# Patient Record
Sex: FEMALE | Race: BLACK OR AFRICAN AMERICAN | NOT HISPANIC OR LATINO
[De-identification: names, ages, dates, MRNs, and addresses within clinical notes are randomized per-mention and may not be internally consistent; named-entity substitution may affect disease eponyms.]

---

## 2020-01-23 ENCOUNTER — TRANSCRIPTION ENCOUNTER (OUTPATIENT)
Age: 55
End: 2020-01-23

## 2020-11-15 ENCOUNTER — INPATIENT (INPATIENT)
Facility: HOSPITAL | Age: 55
LOS: 0 days | Discharge: HOME | DRG: 494 | End: 2020-11-15
Attending: ORTHOPAEDIC SURGERY | Admitting: ORTHOPAEDIC SURGERY
Payer: COMMERCIAL

## 2020-11-15 ENCOUNTER — TRANSCRIPTION ENCOUNTER (OUTPATIENT)
Age: 55
End: 2020-11-15

## 2020-11-15 VITALS
TEMPERATURE: 99 F | HEART RATE: 95 BPM | OXYGEN SATURATION: 97 % | DIASTOLIC BLOOD PRESSURE: 82 MMHG | SYSTOLIC BLOOD PRESSURE: 137 MMHG | RESPIRATION RATE: 22 BRPM | WEIGHT: 220.02 LBS

## 2020-11-15 VITALS
TEMPERATURE: 98 F | DIASTOLIC BLOOD PRESSURE: 74 MMHG | HEART RATE: 87 BPM | OXYGEN SATURATION: 96 % | RESPIRATION RATE: 18 BRPM | SYSTOLIC BLOOD PRESSURE: 123 MMHG

## 2020-11-15 DIAGNOSIS — S82.891A OTHER FRACTURE OF RIGHT LOWER LEG, INITIAL ENCOUNTER FOR CLOSED FRACTURE: ICD-10-CM

## 2020-11-15 LAB
ALBUMIN SERPL ELPH-MCNC: 4.1 G/DL — SIGNIFICANT CHANGE UP (ref 3.3–5)
ALP SERPL-CCNC: 51 U/L — SIGNIFICANT CHANGE UP (ref 40–120)
ALT FLD-CCNC: 32 U/L — SIGNIFICANT CHANGE UP (ref 12–78)
ANION GAP SERPL CALC-SCNC: 5 MMOL/L — SIGNIFICANT CHANGE UP (ref 5–17)
APTT BLD: 28 SEC — SIGNIFICANT CHANGE UP (ref 27.5–35.5)
AST SERPL-CCNC: 21 U/L — SIGNIFICANT CHANGE UP (ref 15–37)
BASOPHILS # BLD AUTO: 0.04 K/UL — SIGNIFICANT CHANGE UP (ref 0–0.2)
BASOPHILS NFR BLD AUTO: 0.4 % — SIGNIFICANT CHANGE UP (ref 0–2)
BILIRUB SERPL-MCNC: 0.4 MG/DL — SIGNIFICANT CHANGE UP (ref 0.2–1.2)
BUN SERPL-MCNC: 15 MG/DL — SIGNIFICANT CHANGE UP (ref 7–23)
CALCIUM SERPL-MCNC: 9.3 MG/DL — SIGNIFICANT CHANGE UP (ref 8.5–10.1)
CHLORIDE SERPL-SCNC: 110 MMOL/L — HIGH (ref 96–108)
CO2 SERPL-SCNC: 25 MMOL/L — SIGNIFICANT CHANGE UP (ref 22–31)
CREAT SERPL-MCNC: 0.88 MG/DL — SIGNIFICANT CHANGE UP (ref 0.5–1.3)
EOSINOPHIL # BLD AUTO: 0.05 K/UL — SIGNIFICANT CHANGE UP (ref 0–0.5)
EOSINOPHIL NFR BLD AUTO: 0.5 % — SIGNIFICANT CHANGE UP (ref 0–6)
GLUCOSE SERPL-MCNC: 127 MG/DL — HIGH (ref 70–99)
HCG SERPL-ACNC: 1 MIU/ML — SIGNIFICANT CHANGE UP
HCT VFR BLD CALC: 39.4 % — SIGNIFICANT CHANGE UP (ref 34.5–45)
HGB BLD-MCNC: 13.9 G/DL — SIGNIFICANT CHANGE UP (ref 11.5–15.5)
IMM GRANULOCYTES NFR BLD AUTO: 0.4 % — SIGNIFICANT CHANGE UP (ref 0–1.5)
INR BLD: 0.93 RATIO — SIGNIFICANT CHANGE UP (ref 0.88–1.16)
LYMPHOCYTES # BLD AUTO: 1.26 K/UL — SIGNIFICANT CHANGE UP (ref 1–3.3)
LYMPHOCYTES # BLD AUTO: 12.5 % — LOW (ref 13–44)
MCHC RBC-ENTMCNC: 28.5 PG — SIGNIFICANT CHANGE UP (ref 27–34)
MCHC RBC-ENTMCNC: 35.3 GM/DL — SIGNIFICANT CHANGE UP (ref 32–36)
MCV RBC AUTO: 80.7 FL — SIGNIFICANT CHANGE UP (ref 80–100)
MONOCYTES # BLD AUTO: 0.53 K/UL — SIGNIFICANT CHANGE UP (ref 0–0.9)
MONOCYTES NFR BLD AUTO: 5.2 % — SIGNIFICANT CHANGE UP (ref 2–14)
NEUTROPHILS # BLD AUTO: 8.19 K/UL — HIGH (ref 1.8–7.4)
NEUTROPHILS NFR BLD AUTO: 81 % — HIGH (ref 43–77)
PLATELET # BLD AUTO: 301 K/UL — SIGNIFICANT CHANGE UP (ref 150–400)
POTASSIUM SERPL-MCNC: 4.4 MMOL/L — SIGNIFICANT CHANGE UP (ref 3.5–5.3)
POTASSIUM SERPL-SCNC: 4.4 MMOL/L — SIGNIFICANT CHANGE UP (ref 3.5–5.3)
PROT SERPL-MCNC: 7.9 GM/DL — SIGNIFICANT CHANGE UP (ref 6–8.3)
PROTHROM AB SERPL-ACNC: 10.9 SEC — SIGNIFICANT CHANGE UP (ref 10.6–13.6)
RBC # BLD: 4.88 M/UL — SIGNIFICANT CHANGE UP (ref 3.8–5.2)
RBC # FLD: 12.5 % — SIGNIFICANT CHANGE UP (ref 10.3–14.5)
SARS-COV-2 IGG SERPL QL IA: NEGATIVE — SIGNIFICANT CHANGE UP
SARS-COV-2 IGM SERPL IA-ACNC: 0.08 INDEX — SIGNIFICANT CHANGE UP
SARS-COV-2 RNA SPEC QL NAA+PROBE: SIGNIFICANT CHANGE UP
SODIUM SERPL-SCNC: 140 MMOL/L — SIGNIFICANT CHANGE UP (ref 135–145)
WBC # BLD: 10.11 K/UL — SIGNIFICANT CHANGE UP (ref 3.8–10.5)
WBC # FLD AUTO: 10.11 K/UL — SIGNIFICANT CHANGE UP (ref 3.8–10.5)

## 2020-11-15 PROCEDURE — C1889: CPT

## 2020-11-15 PROCEDURE — 71045 X-RAY EXAM CHEST 1 VIEW: CPT | Mod: 26

## 2020-11-15 PROCEDURE — C1713: CPT

## 2020-11-15 PROCEDURE — 93010 ELECTROCARDIOGRAM REPORT: CPT

## 2020-11-15 PROCEDURE — 73130 X-RAY EXAM OF HAND: CPT | Mod: 26,RT

## 2020-11-15 PROCEDURE — 73590 X-RAY EXAM OF LOWER LEG: CPT | Mod: 26,RT

## 2020-11-15 PROCEDURE — 76000 FLUOROSCOPY <1 HR PHYS/QHP: CPT

## 2020-11-15 PROCEDURE — 27792 TREATMENT OF ANKLE FRACTURE: CPT | Mod: RT

## 2020-11-15 PROCEDURE — 73610 X-RAY EXAM OF ANKLE: CPT | Mod: 26,RT

## 2020-11-15 PROCEDURE — 73610 X-RAY EXAM OF ANKLE: CPT | Mod: 26,RT,77

## 2020-11-15 PROCEDURE — 27829 TREAT LOWER LEG JOINT: CPT | Mod: RT

## 2020-11-15 RX ORDER — OXYCODONE HYDROCHLORIDE 5 MG/1
1 TABLET ORAL
Qty: 28 | Refills: 0
Start: 2020-11-15 | End: 2020-11-21

## 2020-11-15 RX ORDER — SODIUM CHLORIDE 9 MG/ML
1000 INJECTION INTRAMUSCULAR; INTRAVENOUS; SUBCUTANEOUS
Refills: 0 | Status: DISCONTINUED | OUTPATIENT
Start: 2020-11-15 | End: 2020-11-15

## 2020-11-15 RX ORDER — ASPIRIN/CALCIUM CARB/MAGNESIUM 324 MG
1 TABLET ORAL
Qty: 60 | Refills: 0
Start: 2020-11-15 | End: 2020-12-14

## 2020-11-15 RX ORDER — OXYCODONE HYDROCHLORIDE 5 MG/1
5 TABLET ORAL EVERY 4 HOURS
Refills: 0 | Status: DISCONTINUED | OUTPATIENT
Start: 2020-11-15 | End: 2020-11-15

## 2020-11-15 RX ORDER — OXYCODONE HYDROCHLORIDE 5 MG/1
10 TABLET ORAL ONCE
Refills: 0 | Status: DISCONTINUED | OUTPATIENT
Start: 2020-11-15 | End: 2020-11-15

## 2020-11-15 RX ORDER — FENTANYL CITRATE 50 UG/ML
50 INJECTION INTRAVENOUS
Refills: 0 | Status: DISCONTINUED | OUTPATIENT
Start: 2020-11-15 | End: 2020-11-15

## 2020-11-15 RX ORDER — ACETAMINOPHEN 500 MG
975 TABLET ORAL EVERY 8 HOURS
Refills: 0 | Status: DISCONTINUED | OUTPATIENT
Start: 2020-11-15 | End: 2020-11-15

## 2020-11-15 RX ORDER — IBUPROFEN 200 MG
600 TABLET ORAL ONCE
Refills: 0 | Status: COMPLETED | OUTPATIENT
Start: 2020-11-15 | End: 2020-11-15

## 2020-11-15 RX ORDER — ONDANSETRON 8 MG/1
4 TABLET, FILM COATED ORAL EVERY 6 HOURS
Refills: 0 | Status: DISCONTINUED | OUTPATIENT
Start: 2020-11-15 | End: 2020-11-15

## 2020-11-15 RX ORDER — OXYCODONE HYDROCHLORIDE 5 MG/1
5 TABLET ORAL ONCE
Refills: 0 | Status: DISCONTINUED | OUTPATIENT
Start: 2020-11-15 | End: 2020-11-15

## 2020-11-15 RX ORDER — LANOLIN ALCOHOL/MO/W.PET/CERES
3 CREAM (GRAM) TOPICAL AT BEDTIME
Refills: 0 | Status: DISCONTINUED | OUTPATIENT
Start: 2020-11-15 | End: 2020-11-15

## 2020-11-15 RX ORDER — ACETAMINOPHEN 500 MG
650 TABLET ORAL EVERY 6 HOURS
Refills: 0 | Status: DISCONTINUED | OUTPATIENT
Start: 2020-11-15 | End: 2020-11-15

## 2020-11-15 RX ORDER — ONDANSETRON 8 MG/1
4 TABLET, FILM COATED ORAL ONCE
Refills: 0 | Status: DISCONTINUED | OUTPATIENT
Start: 2020-11-15 | End: 2020-11-15

## 2020-11-15 RX ORDER — HYDROMORPHONE HYDROCHLORIDE 2 MG/ML
0.5 INJECTION INTRAMUSCULAR; INTRAVENOUS; SUBCUTANEOUS
Refills: 0 | Status: DISCONTINUED | OUTPATIENT
Start: 2020-11-15 | End: 2020-11-15

## 2020-11-15 RX ORDER — OXYCODONE HYDROCHLORIDE 5 MG/1
10 TABLET ORAL EVERY 4 HOURS
Refills: 0 | Status: DISCONTINUED | OUTPATIENT
Start: 2020-11-15 | End: 2020-11-15

## 2020-11-15 RX ORDER — SODIUM CHLORIDE 9 MG/ML
1000 INJECTION, SOLUTION INTRAVENOUS
Refills: 0 | Status: DISCONTINUED | OUTPATIENT
Start: 2020-11-15 | End: 2020-11-15

## 2020-11-15 RX ORDER — MEPERIDINE HYDROCHLORIDE 50 MG/ML
12.5 INJECTION INTRAMUSCULAR; INTRAVENOUS; SUBCUTANEOUS
Refills: 0 | Status: DISCONTINUED | OUTPATIENT
Start: 2020-11-15 | End: 2020-11-15

## 2020-11-15 RX ADMIN — Medication 600 MILLIGRAM(S): at 09:12

## 2020-11-15 NOTE — ED ADULT NURSE NOTE - TEMPLATE LIST FOR HEAD TO TOE ASSESSMENT
Instructions: This plan will send the code FBSE to the PM system.  DO NOT or CHANGE the price. Price (Do Not Change): 0.00 Detail Level: Simple General

## 2020-11-15 NOTE — ED ADULT TRIAGE NOTE - STATUS:
Airway  Urgency: elective    Date/Time: 2/25/2020 12:07 PM    General Information and Staff    Patient location during procedure: OR  Anesthesiologist: Placido Schrader MD    Indications and Patient Condition    Preoxygenated: yes      Final Airway Details  Final airway type: supraglottic airway      Successful airway: classic  Size 4    Number of attempts at approach: 1               Applied

## 2020-11-15 NOTE — ED PROVIDER NOTE - PROGRESS NOTE DETAILS
Distal fib fx, medial widening c/w bimall -- ortho consulted. Pre-op labs, ECG ordered. Patient updated.

## 2020-11-15 NOTE — ED ADULT NURSE NOTE - NSIMPLEMENTINTERV_GEN_ALL_ED
Implemented All Fall Risk Interventions:  [place] to call system. Call bell, personal items and telephone within reach. Instruct patient to call for assistance. Room bathroom lighting operational. Non-slip footwear when patient is off stretcher. Physically safe environment: no spills, clutter or unnecessary equipment. Stretcher in lowest position, wheels locked, appropriate side rails in place. Provide visual cue, wrist band, yellow gown, etc. Monitor gait and stability. Monitor for mental status changes and reorient to person, place, and time. Review medications for side effects contributing to fall risk. Reinforce activity limits and safety measures with patient and family.

## 2020-11-15 NOTE — H&P ADULT - NSHPPHYSICALEXAM_GEN_ALL_CORE
Vitals:  T(C): 36.8 (11-15-20 @ 08:46), Max: 37.1 (11-15-20 @ 07:43)  HR: 87 (11-15-20 @ 08:46) (87 - 95)  BP: 136/95 (11-15-20 @ 08:46) (136/95 - 137/82)  RR: 17 (11-15-20 @ 08:46) (17 - 22)  SpO2: 98% (11-15-20 @ 08:46) (97% - 98%)    Physical Exam:  Gen: NAD, AAOx3    RLE:   Skin intact  +edema, erythema, ecchymosis at ankle  +TTP over medial and lateral malleoli   Gross Bony Deformity of Ankle  No bony TTP of Hip/Knee/Foot/Toes  NT with AROM/PROM of Hip/Knee/Toes  Able to SLR  Negative logroll  +EHL/FHL/TA/GS  SILT L2-S1  +DP/PT Pulses  Compartments soft and compressible  No calf TTP B/L    Secondary Assessment:  NC/AT, NTTP of clavicles, NTTP of C-,T-,L-Spine, NTTP of Pelvis  UEs: NTTP of Shoulders, Elbows, Wrists, Hands; NT with AROM/PROM of Shoulders, Elbows, Wrists, Hands; AIN/PIN/Med/Uln/Msc/Rad/Ax intact  LLE: Able to SLR, NT with Log Roll, NT with Heel Strike, NTTP of Hip, Knee, Ankle, Foot; NT with AROM/PROM of Hip, Knee, Ankle, Foot; Q/H/Gsc/TA/EHL/FHL intact    Procedure: Procedure explained in detail to patient at bedside. Pt expressed understanding and all questions were answered. Consent for procedure was verbally obtained. Under sterile technique, 10cc 1% lidocaine were injected into the fracture site as a hematoma block. Adequate anesthesia obtained with hematoma block. Closed reduction of the fracture was performed and patient was placed into a well-padded tri-mejias splint. Pt NV intact following splint placement and post reduction XRs demonstrated adequate reduction.

## 2020-11-15 NOTE — H&P ADULT - ASSESSMENT
Pt is a 54y Female with a Right Zaire Equivalent Ankle Fracture-Dislocation.  -Closed Fracture, NV Intact  -Pain control  -XR R Ankle/Tib Fib: comminuted fracture of distal fibula at the level of the plafond, diastasis with tibiotalar dislocation indicative of full-thickness deltoid rupture  -Pt placed in well-padded tri-mejias splint. NV Intact following splint placement. Discussed proper splint maintenance with pt.  -Post-reduction XR demonstrated adequate reduction.  -Rest, ice, elevate affected ankle  -NWB on affected ankle  -Admit to Ortho  -NPO except meds  -IVF while NPO  -FU Covid  -FU Preop labs  -Plan for OR today for ORIF R Ankle with Dr. Hsu.  -Discussed with Dr. Hsu.    Lien Dupree M.D.  PGY-3 Orthopaedic Surgery

## 2020-11-15 NOTE — DISCHARGE NOTE NURSING/CASE MANAGEMENT/SOCIAL WORK - PATIENT PORTAL LINK FT
You can access the FollowMyHealth Patient Portal offered by [facility] by registering at the following website: [email or web address]. By joining [employer]’s FollowMyHealth portal, you will also be able to view your health information using other applications (apps) compatible with our system.

## 2020-11-15 NOTE — DISCHARGE NOTE PROVIDER - NSDCCPCAREPLAN_GEN_ALL_CORE_FT
PRINCIPAL DISCHARGE DIAGNOSIS  Diagnosis: Ankle fracture, right  Assessment and Plan of Treatment: 1.	Pain Control  2.	Non-weight bearing  right lower extremity in trilam splint, with assistive devices as needed.  3.	DVT Prophylaxis--Aspirin 325 twice a day for 30 days.  4.	PT as needed  5.	Follow up with Dr. George as outpatient in 10-14 days after discharge from the hospital or rehab. Call office for appointment.   6.	Staple/Suture removal and repeat x-rays on Post-Op Day 14.  7.	Ice/Elevate affected area as needed  8.	Keep Dressing/Splint Clean and dry.

## 2020-11-15 NOTE — ED ADULT TRIAGE NOTE - CHIEF COMPLAINT QUOTE
pt twisted right ankle while walking her dog. did not fall. painful weight bearing. right ankle swollen.

## 2020-11-15 NOTE — ED PROVIDER NOTE - OBJECTIVE STATEMENT
54F no PMH here c/o right ankle pain s/p slip and fall. No head/neck/back pain. Right ring finger pain.  Unable to bear weight. No other complaints

## 2020-11-15 NOTE — DISCHARGE NOTE PROVIDER - HOSPITAL COURSE
The patient is a 54 year old female status post Open Reduction Surgical Fixation of a Right Bimalleolar Equivalent Fracture after being admitted through [facility] Emergency Room. The Patient was medically optimized for the previously mentioned surgical procedure. The patient was taken to the operating room on 11/15/20. Prophylactic antibiotics were started before the procedure. There were no complications during the procedure and patient tolerated the procedure well. The patient was transferred to recovery room in stable condition and subsequently to surgical floor.  Patient was placed on ASA for anticoagulation.  All home medications were continued. The patient was discharged in stable condition to follow up as outpatient.

## 2020-11-15 NOTE — CONSULT NOTE ADULT - ASSESSMENT
Pt is a 54y Female with a Zaire Equivalent Ankle Fracture-Dislocation.  -Closed Fracture, NV Intact  -Pain control  -XR R Ankle/Tib Fib: comminuted fracture of distal fibula at the level of the plafond, diastasis with tibiotalar dislocation indicative of full-thickness deltoid rupture  -Pt placed in well-padded tri-mejias splint. NV Intact following splint placement. Discussed proper splint maintenance with pt.  -Post-reduction XR demonstrated adequate reduction.  -Rest, ice, elevate affected ankle  -NWB on affected ankle  -Discussed signs and symptoms of compartment syndrome with patient. Pt informed to follow up immediately should these signs or symptoms develop. Pt expressed understanding and all questions were answered.  -Discussed possible need for surgical fixation.  -Please follow up in office within 3-5 days of discharge from ED with Dr. Hsu. Call office for appointment.  -Ortho stable for discharge.    Lien Dupree M.D.  PGY-3 Orthopaedic Surgery ****NOTE INCOMPLETE--IN PROGRESS--WILL UPDATE WITH COMPLETE PLAN****

## 2020-11-15 NOTE — H&P ADULT - NSHPLABSRESULTS_GEN_ALL_CORE
Imaging:  XR R Ankle/Tib Fib: comminuted fracture of distal fibula at the level of the plafond, diastasis with tibiotalar dislocation indicative of full-thickness deltoid rupture    Labs:                        13.9   10.11 )-----------( 301      ( 15 Nov 2020 08:53 )             39.4

## 2020-11-15 NOTE — ED ADULT NURSE NOTE - OBJECTIVE STATEMENT
c/o slip and fall on grass while walking dog this morning, denies hitting head, denies LOC, does not take daily anticoagulants, c/o right ankle injury and pain, right ankle medial swelling noted Hx; denies

## 2020-11-15 NOTE — DISCHARGE NOTE PROVIDER - NSDCFUADDINST_GEN_ALL_CORE_FT
1.	Pain Control 2.	Non-weight bearing  right lower extremity in trilam splint, with assistive devices as needed. 3.	DVT Prophylaxis--Aspirin 325 twice a day for 30 days. 4.	PT as needed 5.	Follow up with Dr. George as outpatient in 10-14 days after discharge from the hospital or rehab. Call office for appointment.  6.	Staple/Suture removal and repeat x-rays on Post-Op Day 14. 7.	Ice/Elevate affected area as needed 8.	Keep Dressing/Splint Clean and dry.

## 2020-11-15 NOTE — DISCHARGE NOTE PROVIDER - NSDCMRMEDTOKEN_GEN_ALL_CORE_FT
aspirin 325 mg oral tablet: 1 tab(s) orally 2 times a day   oxyCODONE 5 mg oral tablet: 1 tab(s) orally every 6 hours, As Needed for moderate-severe pain MDD:4

## 2020-11-15 NOTE — H&P ADULT - ATTENDING COMMENTS
Orthopedic Sports Attending:    Agree with above resident/PA note.  Note edited where necessary.      Patient seen and examined at bedside. Discussed the risks, complications, benefits and alternatives of care. Confirmed procedure and site. Patient fully understands and would like to proceed with surgery.    Santiago Hsu, DO  Orthopaedic Surgery

## 2020-11-15 NOTE — DISCHARGE NOTE PROVIDER - CARE PROVIDER_API CALL
Santiago Hsu  [facility][note 3]  [address]  [place] [address]  Phone: [phone]  Fax: [phone]  Follow Up Time:

## 2020-11-15 NOTE — ED ADULT NURSE NOTE - CAS EDN DISCHARGE ASSESSMENT
Alert and oriented to person, place and time no acute respiratory distress, OOB to bathroom, voiding without difficulty, no c/o pain currently., npo status maintained./Alert and oriented to person, place and time

## 2020-11-15 NOTE — H&P ADULT - HISTORY OF PRESENT ILLNESS
Pt is a 54y Female presenting with R ankle pain s/p mechanical fall this morning while walking her dog. Pt is a community ambulatory at baseline. Pt has been unable to bear weight on affected extremity since time of injury. Pt denies numbness, tingling, or paresthesias in affected limb. Pt denies headstrike or LOC and denies any other orthopaedic injuries at this time. Pt denies taking blood thinners. Pt last ate at yesterday. Denies fevers, dizziness, CP, SOB, N/V, calf pain. Pt works as an  in [place]. Patient has never seen and orthopaedist before.

## 2020-11-17 PROBLEM — Z78.9 OTHER SPECIFIED HEALTH STATUS: Chronic | Status: ACTIVE | Noted: 2020-11-15

## 2020-11-19 DIAGNOSIS — S93.04XA DISLOCATION OF RIGHT ANKLE JOINT, INITIAL ENCOUNTER: ICD-10-CM

## 2020-11-19 DIAGNOSIS — S82.61XA DISPLACED FRACTURE OF LATERAL MALLEOLUS OF RIGHT FIBULA, INITIAL ENCOUNTER FOR CLOSED FRACTURE: ICD-10-CM
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
## 2020-11-19 DIAGNOSIS — S82.891A OTHER FRACTURE OF RIGHT LOWER LEG, INITIAL ENCOUNTER FOR CLOSED FRACTURE: ICD-10-CM
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
## 2020-11-30 PROBLEM — Z00.00 ENCOUNTER FOR PREVENTIVE HEALTH EXAMINATION: Status: ACTIVE | Noted: 2020-11-30

## 2020-12-01 ENCOUNTER — APPOINTMENT (OUTPATIENT)
Dept: ORTHOPEDIC SURGERY | Facility: CLINIC | Age: 55
End: 2020-12-01
Payer: COMMERCIAL

## 2020-12-01 VITALS
WEIGHT: 220 LBS | HEART RATE: 99 BPM | BODY MASS INDEX: 34.53 KG/M2 | HEIGHT: 67 IN | DIASTOLIC BLOOD PRESSURE: 80 MMHG | SYSTOLIC BLOOD PRESSURE: 120 MMHG

## 2020-12-01 PROCEDURE — 73610 X-RAY EXAM OF ANKLE: CPT | Mod: 26,RT

## 2020-12-01 PROCEDURE — 29405 APPL SHORT LEG CAST: CPT | Mod: 58,RT

## 2020-12-01 PROCEDURE — 99024 POSTOP FOLLOW-UP VISIT: CPT

## 2020-12-01 NOTE — HISTORY OF PRESENT ILLNESS
[___ Days Post Op] : post op day #[unfilled] [Clean/Dry/Intact] : clean, dry and intact [Swelling] : swollen [Neuro Intact] : an unremarkable neurological exam [Vascular Intact] : ~T peripheral vascular exam normal [Negative Ran's] : maneuvers demonstrated a negative Ran's sign [Xray (Date:___)] : [unfilled] Xray -  [Doing Well] : is doing well [Excellent Pain Control] : has excellent pain control [No Sign of Infection] : is showing no signs of infection [Chills] : no chills [Fever] : no fever [Nausea] : no nausea [Vomiting] : no vomiting [Healed] : not healed [Erythema] : not erythematous [Discharge] : absent of discharge [Dehiscence] : not dehisced [de-identified] : S/P ORIF right distal fibula with syndesmotic screw.  DOS: 11/15/2020. [de-identified] : GURPREET is a 54 year old female who presents today 16 days S/P right distal fibula ORIF.  She admits to injuring her ankle on 11/15/2020 and being brought to  ED by ambulance.  Patient underwent surgery the same day.  She presents with her splint in tact and NWB on crutches.  Patient's skin is clean, dry and intact without evidence of irritation.  She is able to move her toes.  She denies calf warmth, pain, or swelling.  No fevers, chills, nausea or vomiting.  She denies taking pain medications at this time.  She has kept the splint dry.  She admits to occasional twinges of discomfort. She admits to sitting at a desk working from home with her foot dependant or out in front of her on a chair without regularly elevating above heart level.  [de-identified] : Right Ankle\par \par Incisions are clean, dry and intact. No surrounding erythema. No drainage. Wound appears to be healing well. ROM not testing due to recent procedure.  Motor and sensation are intact distally. She has full range of motion of all toes. She has good nerve function. She has no sensory deficits over the saphenous nerve. Dorsalis pedis pulses 2+. Cap refill 2+ to all toes.\par \par Neg homans, calf soft compressible [de-identified] : 3 views of the right ankle were performed today and are available for me to review.  They were discussed with the patient and demonstrate adequate positioning of the hardware.  Mild callus formation noted. No new fracture, no dislocation, no deformities.\par \par \par These radiographs were reviewed and confirmed by Dr Hsu. [de-identified] : GURPREET is a 54 year old female who presents today 16 days S/P right distal fibula ORIF.  She admits to injuring her ankle on 11/15/2020 and being brought to  ED by ambulance.  Patient underwent surgery the same day.  She presents with her splint in tact and NWB on crutches.  Patient's skin is clean, dry and intact without evidence of irritation.  She is able to move her toes.  She denies calf warmth, pain, or swelling.  No fevers, chills, nausea or vomiting.  She denies taking pain medications at this time.  She has kept the splint dry.  She admits to occasional twinges of discomfort. She admits to sitting at a desk working from home with her foot dependant or out in front of her on a chair without regularly elevating above heart level. At this time i educated her on proper elevation above heart level. Her stitches as well as her splint were removed today and a short leg cast was placed. Patient tolerated this well. Able to move all toes with cap refill < 2 seconds throught after placement without pain. Educated patient on signs and symptoms of compartment syndrome and she understands the importance of elevation to prevent this. If symptoms occur after hours she will go to nearest ED for cast removal. If during office hours she will see us right away. She will remain fully NWB with use of her crutches and see us in 4 weeks for new xrays, cast removal and wound check. She agrees with the above plan and all questions were answered.\par

## 2021-01-05 ENCOUNTER — APPOINTMENT (OUTPATIENT)
Dept: ORTHOPEDIC SURGERY | Facility: CLINIC | Age: 56
End: 2021-01-05
Payer: COMMERCIAL

## 2021-01-05 PROCEDURE — 99024 POSTOP FOLLOW-UP VISIT: CPT

## 2021-01-05 PROCEDURE — 73600 X-RAY EXAM OF ANKLE: CPT | Mod: RT

## 2021-01-06 NOTE — HISTORY OF PRESENT ILLNESS
[___ Weeks Post Op] : [unfilled] weeks post op [Clean/Dry/Intact] : clean, dry and intact [Swelling] : swollen [Neuro Intact] : an unremarkable neurological exam [Vascular Intact] : ~T peripheral vascular exam normal [Negative Ran's] : maneuvers demonstrated a negative Ran's sign [Xray (Date:___)] : [unfilled] Xray -  [Doing Well] : is doing well [Excellent Pain Control] : has excellent pain control [No Sign of Infection] : is showing no signs of infection [Chills] : no chills [Fever] : no fever [Nausea] : no nausea [Vomiting] : no vomiting [Healed] : not healed [Erythema] : not erythematous [Discharge] : absent of discharge [Dehiscence] : not dehisced [de-identified] : S/P ORIF right distal fibula with syndesmotic screw.  DOS: 11/15/2020. [de-identified] : GURPREET is a 55 year old female who presents today 7 weeks S/P right distal fibula ORIF.  She admits to injuring her ankle on 11/15/2020 and being brought to  ED by ambulance.  Patient underwent surgery the same day.  She presents with her cast in tact and NWB on crutches.  Patient's skin is clean, dry and intact without evidence of irritation.  She is able to move her toes.  She denies calf warmth, pain, or swelling.  No fevers, chills, nausea or vomiting.  She denies taking pain medications at this time.  She has kept the cast dry.  She admits to occasional twinges of discomfort. She admits to sitting at a desk working from home with her foot dependant or out in front of her on a chair without regularly elevating above heart level.  [de-identified] : Right Ankle\par \par Incisions are clean, dry and intact. No surrounding erythema. No drainage. Wound appears to be healing well. ROM not testing due to recent procedure.  Motor and sensation are intact distally. She has full range of motion of all toes. She has good nerve function. She has no sensory deficits over the saphenous nerve. Dorsalis pedis pulses 2+. Cap refill 2+ to all toes.\par \par Neg homans, calf soft compressible [de-identified] : 3 views of the right ankle were performed today and are available for me to review.  They were discussed with the patient and demonstrate adequate positioning of the hardware.  Mild callus formation noted. No new fracture, no dislocation, no deformities.\par  [de-identified] : GURPREET is a 55 year old female who presents today 7 weeks S/P right distal fibula ORIF.  She admits to injuring her ankle on 11/15/2020 and being brought to  ED by ambulance.  Patient underwent surgery the same day.  She presents with her cast in tact and NWB on crutches.  Patient's skin is clean, dry and intact without evidence of irritation.  She is able to move her toes.  She denies calf warmth, pain, or swelling.  No fevers, chills, nausea or vomiting.  She denies taking pain medications at this time.  She has kept the cast dry.  She admits to occasional twinges of discomfort. She admits to sitting at a desk working from home with her foot dependant or out in front of her on a chair without regularly elevating above heart level. \par \par At this time i educated her on proper elevation above heart level. Her short leg cast was removed today and the patient was placed into a camboot today. Patient tolerated this well.  She will remain fully NWB with use of her crutches and see us in 4 weeks for new xrays, and wound check. She agrees with the above plan and all questions were answered.\par

## 2021-01-12 ENCOUNTER — NON-APPOINTMENT (OUTPATIENT)
Age: 56
End: 2021-01-12

## 2021-02-04 ENCOUNTER — APPOINTMENT (OUTPATIENT)
Dept: ORTHOPEDIC SURGERY | Facility: CLINIC | Age: 56
End: 2021-02-04
Payer: COMMERCIAL

## 2021-02-04 PROCEDURE — 73610 X-RAY EXAM OF ANKLE: CPT | Mod: RT

## 2021-02-04 PROCEDURE — 99024 POSTOP FOLLOW-UP VISIT: CPT

## 2021-02-08 NOTE — HISTORY OF PRESENT ILLNESS
[___ Weeks Post Op] : [unfilled] weeks post op [Clean/Dry/Intact] : clean, dry and intact [Swelling] : swollen [Neuro Intact] : an unremarkable neurological exam [Vascular Intact] : ~T peripheral vascular exam normal [Negative Ran's] : maneuvers demonstrated a negative Ran's sign [Xray (Date:___)] : [unfilled] Xray -  [Doing Well] : is doing well [Excellent Pain Control] : has excellent pain control [No Sign of Infection] : is showing no signs of infection [Chills] : no chills [Fever] : no fever [Nausea] : no nausea [Vomiting] : no vomiting [Healed] : not healed [Erythema] : not erythematous [Discharge] : absent of discharge [Dehiscence] : not dehisced [de-identified] : S/P ORIF right distal fibula with syndesmotic screw.  DOS: 11/15/2020. [de-identified] : GURPREET is a 55 year old female who presents today 3 months S/P right distal fibula ORIF.  She admits to injuring her ankle on 11/15/2020 and being brought to  ED by ambulance.  Patient underwent surgery the same day.  She presents with her camboot on and NWB on crutches.  Patient's skin is clean, dry and intact without evidence of irritation.  She is able to move her toes.  She denies calf warmth, pain, or swelling.  No fevers, chills, nausea or vomiting.  She denies taking pain medications at this time.  She admits to occasional twinges of discomfort. She admits to sitting at a desk working from home with her foot dependant or out in front of her on a chair. [de-identified] : Right Ankle\par \par Incisions are clean, dry and intact. No surrounding erythema. No drainage. Wound appears to be healing well. ROM not testing due to recent procedure.  Motor and sensation are intact distally. She has full range of motion of all toes. She has good nerve function. She has no sensory deficits over the saphenous nerve. Dorsalis pedis pulses 2+. Cap refill 2+ to all toes.\par \par Neg homans, calf soft compressible [de-identified] : 3 views of the right ankle were performed today and are available for me to review.  They were discussed with the patient and demonstrate adequate positioning of the hardware.  Mild callus formation noted. No new fracture, no dislocation, no deformities. [de-identified] : GURPREET is a 55 year old female who presents today 3 months S/P right distal fibula ORIF.  She admits to injuring her ankle on 11/15/2020 and being brought to  ED by ambulance.  Patient underwent surgery the same day.  She presents with her camboot on and NWB on crutches.  Patient's skin is clean, dry and intact without evidence of irritation.  She is able to move her toes.  She denies calf warmth, pain, or swelling.  No fevers, chills, nausea or vomiting.  She denies taking pain medications at this time.  She admits to occasional twinges of discomfort. She admits to sitting at a desk working from home with her foot dependant or out in front of her on a chair.\par \par We reviewed her radiographs today that show mild callus formation and proper alignment of hardware. We discussed the possible need for removal of syndesmotic screw versus allowing it to heal this way on its own. We discussed the risks versus benefits of both. At this time the patient elects to proceed operative for screw removal. I had my surgical coordinator meet with the patient to go over surgical dates. She will received pre-op clearance by her PCP prior to this procedure. She understands the risks vs benefits of this.\par \par She will remain fully NWB with use of her crutches and camboot until this procedure. She agrees with the above plan and all questions were answered.

## 2021-02-12 ENCOUNTER — APPOINTMENT (OUTPATIENT)
Dept: ORTHOPEDIC SURGERY | Facility: AMBULATORY SURGERY CENTER | Age: 56
End: 2021-02-12
Payer: COMMERCIAL

## 2021-02-12 ENCOUNTER — NON-APPOINTMENT (OUTPATIENT)
Age: 56
End: 2021-02-12

## 2021-02-12 PROCEDURE — 20680 REMOVAL OF IMPLANT DEEP: CPT | Mod: 78,RT

## 2021-02-12 RX ORDER — ASPIRIN 325 MG/1
325 TABLET ORAL
Qty: 28 | Refills: 0 | Status: COMPLETED | COMMUNITY
Start: 2021-02-12 | End: 2021-03-12

## 2021-02-12 RX ORDER — ONDANSETRON 4 MG/1
4 TABLET ORAL
Qty: 30 | Refills: 0 | Status: COMPLETED | COMMUNITY
Start: 2021-02-12 | End: 2021-02-17

## 2021-02-12 RX ORDER — ACETAMINOPHEN 500 MG/1
500 TABLET ORAL
Qty: 30 | Refills: 0 | Status: COMPLETED | COMMUNITY
Start: 2021-02-12 | End: 2021-02-22

## 2021-02-12 RX ORDER — OXYCODONE 5 MG/1
5 TABLET ORAL
Qty: 10 | Refills: 0 | Status: COMPLETED | COMMUNITY
Start: 2021-02-12 | End: 2021-02-17

## 2021-03-01 ENCOUNTER — APPOINTMENT (OUTPATIENT)
Dept: ORTHOPEDIC SURGERY | Facility: CLINIC | Age: 56
End: 2021-03-01
Payer: COMMERCIAL

## 2021-03-01 VITALS — SYSTOLIC BLOOD PRESSURE: 118 MMHG | HEART RATE: 82 BPM | DIASTOLIC BLOOD PRESSURE: 82 MMHG

## 2021-03-01 PROCEDURE — 99024 POSTOP FOLLOW-UP VISIT: CPT

## 2021-03-01 PROCEDURE — 73610 X-RAY EXAM OF ANKLE: CPT | Mod: RT

## 2021-03-01 NOTE — HISTORY OF PRESENT ILLNESS
[Xray (Date:___)] : [unfilled] Xray -  [Clean/Dry/Intact] : clean, dry and intact [Neuro Intact] : an unremarkable neurological exam [Vascular Intact] : ~T peripheral vascular exam normal [Doing Well] : is doing well [Excellent Pain Control] : has excellent pain control [Chills] : no chills [Fever] : no fever [Nausea] : no nausea [Vomiting] : no vomiting [de-identified] : spo removal synelesmotic screw with implantation locking screw R ankle. DOS: 02/12/2021. [de-identified] : GURPREET ARELLANO is a 55 year female being seen for 1st p/o removal synelesmotic screw with implantation locking screw R ankle, 17 days ago. She presents PWB one foot with R boot and 2 crutches. She denies use of OTC pain medications. Patient denies numbness and tingling to the extremities. She reports starting PT this week with no complaints. [de-identified] : Right Ankle Exam\par \par Incision is clean, dry and intact. No surrounding erythema. No drainage. Wound appears to be healing well. She has passive range of motion of  dorsiflexion to 15 degrees and of plantar flexion to 45 degrees. She is limited actively due to weakness of her calf muscles. Passive inversion to 30 degrees and eversion to 20 degrees. Motor and sensation are intact distally. She has full range of motion of all toes. She has good nerve function. She has no sensory deficits over the saphenous nerve. Dorsalis pedis pulses 2+. Cap refill 2+ to all toes. [de-identified] : 3 views of R ankle were performed today and available for me to review. Results were discussed with the patient. They demonstrate no f/x, dislocation or other deformity. Removal of syndesmotic screw noted without fracture and prior plate and screws in proper alignment. [de-identified] : GURPREET ARELLANO is a 55 year female being seen for 1st p/o removal syndesmotic screw with implantation locking screw R ankle, 17 days ago. She presents PWB one foot with R boot and 2 crutches. She denies use of OTC pain medications. Patient denies numbness and tingling to the extremities. She reports starting PT this week with no complaints.\par \par She will continue use of her camboot, progress to WBAT over the next 2 weeks within her camboot with all weightbearing with formal PT 2x/week and she will follow up with us in 4 weeks from today. She agrees with the above plan and all questions were answered.\par

## 2021-03-26 ENCOUNTER — APPOINTMENT (OUTPATIENT)
Dept: ORTHOPEDIC SURGERY | Facility: CLINIC | Age: 56
End: 2021-03-26
Payer: COMMERCIAL

## 2021-03-26 PROCEDURE — 73610 X-RAY EXAM OF ANKLE: CPT | Mod: 26,RT

## 2021-03-26 PROCEDURE — 99024 POSTOP FOLLOW-UP VISIT: CPT

## 2021-03-26 RX ORDER — MELOXICAM 7.5 MG/1
7.5 TABLET ORAL
Qty: 21 | Refills: 0 | Status: COMPLETED | COMMUNITY
Start: 2021-03-26 | End: 2021-04-16

## 2021-03-26 NOTE — HISTORY OF PRESENT ILLNESS
[___ Weeks Post Op] : [unfilled] weeks post op [Clean/Dry/Intact] : clean, dry and intact [Neuro Intact] : an unremarkable neurological exam [Vascular Intact] : ~T peripheral vascular exam normal [Xray (Date:___)] : [unfilled] Xray -  [Doing Well] : is doing well [Excellent Pain Control] : has excellent pain control [Chills] : no chills [Fever] : no fever [Nausea] : no nausea [Vomiting] : no vomiting [de-identified] : spo removal synelesmotic screw with implantation locking screw R ankle. DOS: 02/12/2021. [de-identified] : GURPREET ARELLANO is a 55 year female being seen for 1st p/o removal syndesmotic screw with implantation locking screw R ankle, 6 weeks ago. She presents FWB in R CAM boot. She denies use of OTC pain medications. Patient denies numbness and tingling to the extremities. She reports going to PT 2x/week with relief. She reports stiffness with swelling but also admits to not icing anymore and not elevating since starting PT. [de-identified] : Right Ankle Exam\par \par Incision is clean, dry and intact. No surrounding erythema. No drainage. Wound appears to be healing well. She has passive range of motion of  dorsiflexion to 15 degrees and of plantar flexion to 45 degrees. She is limited actively due to weakness of her calf muscles. Passive inversion to 30 degrees and eversion to 20 degrees. Motor and sensation are intact distally. She has full range of motion of all toes. She has good nerve function. She has no sensory deficits over the saphenous nerve. Dorsalis pedis pulses 2+. Cap refill 2+ to all toes. [de-identified] : 3 views of R ankle were performed today and available for me to review. Results were discussed with the patient. They demonstrate no f/x, dislocation or other deformity. Removal of syndesmotic screw noted without fracture and prior plate and screws in proper alignment. Interval healing noted with xrays compared to her last visit without further displacement. [de-identified] : GURPREET ARELLANO is a 55 year female being seen for 1st p/o removal syndesmotic screw with implantation locking screw R ankle, 6 weeks ago. She presents FWB in R CAM boot. She denies use of OTC pain medications. Patient denies numbness and tingling to the extremities. She reports going to PT 2x/week with relief. She reports stiffness with swelling but also admits to not icing anymore and not elevating since starting PT. Due to this I educated her on proper elevation and regularly icing still. Also due to her swelling and stiffness today I recommended she take a course of meloxicam as directed. She reports being scared to more her ankle side to side of which I advised the importance of doing so to prevent stiffness and for strengthening. She was shown an HEP today to do daily.\par \par She will continue use of her camboot, progress to WBAT over the next 2 weeks without use of her camboot with her therapist. She will first start walking without it around her house to toleration and progress from there. She nataliia lcontinue with formal PT 2x/week and she will follow up with us in 6-8 weeks from today. She agrees with the above plan and all questions were answered.

## 2021-05-17 ENCOUNTER — APPOINTMENT (OUTPATIENT)
Dept: ORTHOPEDIC SURGERY | Facility: CLINIC | Age: 56
End: 2021-05-17
Payer: COMMERCIAL

## 2021-05-17 VITALS
HEART RATE: 85 BPM | WEIGHT: 215 LBS | HEIGHT: 67 IN | DIASTOLIC BLOOD PRESSURE: 85 MMHG | BODY MASS INDEX: 33.74 KG/M2 | SYSTOLIC BLOOD PRESSURE: 130 MMHG

## 2021-05-17 PROCEDURE — 99072 ADDL SUPL MATRL&STAF TM PHE: CPT

## 2021-05-17 PROCEDURE — 73610 X-RAY EXAM OF ANKLE: CPT | Mod: RT

## 2021-05-17 PROCEDURE — 99213 OFFICE O/P EST LOW 20 MIN: CPT

## 2021-05-17 NOTE — PHYSICAL EXAM
[de-identified] : Physical Exam:\par General: Well appearing, no acute distress\par Neurologic: A&Ox3, No focal deficits\par Head: NCAT without abrasions, lacerations, or ecchymosis to head, face, or scalp\par Eyes: No scleral icterus, no gross abnormalities\par Respiratory: Equal chest wall expansion bilaterally, no accessory muscle use\par Lymphatic: No lymphadenopathy palpated\par Skin: Warm and dry\par Psychiatric: Normal mood and affect \par \par Right Ankle Exam\par \par Incision is clean, dry and intact. No surrounding erythema. No drainage. Wound appears to be healing well. She has passive range of motion of dorsiflexion to 15 degrees and of plantar flexion to 45 degrees. She is limited actively due to weakness of her calf muscles. Passive inversion to 30 degrees and eversion to 20 degrees. Motor and sensation are intact distally. She has full range of motion of all toes. She has good nerve function. She has no sensory deficits over the saphenous nerve. Dorsalis pedis pulses 2+. Cap refill 2+ to all toes. The surgical incision site(s) was clean, dry and intact. Additional findings included an unremarkable neurological exam and peripheral vascular exam normal.  [de-identified] : 3 views of R ankle were performed today and available for me to review. Results were discussed with the patient. They demonstrate proper alignment and position of hardware, otherwise no f/x, dislocation or other deformity.\par

## 2021-05-17 NOTE — HISTORY OF PRESENT ILLNESS
[___ wks] : [unfilled] week(s) ago [Stable] : stable [2] : an average pain level of 2/10 [Walking] : worsened by walking [de-identified] : GURPREET ARELLANO is a 55 year female being seen for p/o removal syndesmotic screw with implantation locking screw R ankle, 12 weeks ago. She presents ******************. Currently, she reports no pain unless she is on her feet all day. She reports going to PT 2x/week with relief. At this time she is also trying out different shoes to decrease her R ankle pain. She does admit to overdoing it over the weekend when she moved.

## 2021-05-17 NOTE — REASON FOR VISIT
[Follow-Up Visit] : a follow-up visit for [FreeTextEntry2] : spo removal syndesmotic screw with implantation locking screw R ankle. DOS: 02/12/2021.

## 2021-05-17 NOTE — DISCUSSION/SUMMARY
[de-identified] : Nimco is a 55-year-old female status post ORIF ankle as well status post removal of hardware.  We had a thorough discussion regarding the nature of her pain, the pathophysiology, as well as all treatment options. At this point, pt is doing very well. Conservative measures of treatment include rest until asymptomatic, activity avoidance, NSAID's PRN, application to ice to the area 2-3x daily for 20 minutes, with gradual return to activities. Pt will continue physical therapy and I advised her to transition slowly into HEP. Patient will follow up in 6-8 wks for repeat clinical assessment. All questions were answered and the patient verbalized understanding. The patient is in agreement with this treatment plan.

## 2021-05-17 NOTE — ADDENDUM
[FreeTextEntry1] : Documented by Donald Vang acting as a scribe for Dr. Hsu on 05/17/2021. \par \par All medical record entries made by the Scribe were at my, Dr. Hsu's, direction and\par personally dictated by me on 05/17/2021. I have reviewed the chart and agree that the record\par accurately reflects my personal performance of the history, physical exam, procedure and imaging.

## 2021-05-17 NOTE — REVIEW OF SYSTEMS
[FreeTextEntry9] : spo removal syndesmotic screw with implantation locking screw R ankle. DOS: 02/12/2021.

## 2021-07-19 ENCOUNTER — APPOINTMENT (OUTPATIENT)
Dept: ORTHOPEDIC SURGERY | Facility: CLINIC | Age: 56
End: 2021-07-19
Payer: COMMERCIAL

## 2021-07-19 VITALS
DIASTOLIC BLOOD PRESSURE: 83 MMHG | SYSTOLIC BLOOD PRESSURE: 126 MMHG | HEART RATE: 76 BPM | WEIGHT: 215 LBS | BODY MASS INDEX: 33.74 KG/M2 | HEIGHT: 67 IN

## 2021-07-19 PROCEDURE — 99072 ADDL SUPL MATRL&STAF TM PHE: CPT

## 2021-07-19 PROCEDURE — 99214 OFFICE O/P EST MOD 30 MIN: CPT

## 2021-07-19 NOTE — REASON FOR VISIT
[Follow-Up Visit] : a follow-up visit for [FreeTextEntry2] : spo removal synelesmotic screw with implantation locking screw R ankle. DOS: 02/12/2021.

## 2021-07-19 NOTE — REVIEW OF SYSTEMS
[Negative] : Heme/Lymph [FreeTextEntry9] : spo removal syndesmotic screw with implantation locking screw R ankle. DOS: 02/12/2021.

## 2021-07-19 NOTE — PHYSICAL EXAM
[de-identified] : Physical Exam:\par General: Well appearing, no acute distress\par Neurologic: A&Ox3, No focal deficits\par Head: NCAT without abrasions, lacerations, or ecchymosis to head, face, or scalp\par Eyes: No scleral icterus, no gross abnormalities\par Respiratory: Equal chest wall expansion bilaterally, no accessory muscle use\par Lymphatic: No lymphadenopathy palpated\par Skin: Warm and dry\par Psychiatric: Normal mood and affect \par \par Right Ankle Exam\par \par Incision is clean, dry and intact. No surrounding erythema. No drainage. Wound appears to be healing well. She has passive range of motion of dorsiflexion to 12 degrees and of plantar flexion to 45 degrees. Passive inversion to 30 degrees and eversion to 20 degrees. Motor and sensation are intact distally. She has full range of motion of all toes. She has good nerve function. She has no sensory deficits over the saphenous nerve. Dorsalis pedis pulses 2+. Cap refill 2+ to all toes. The surgical incision site(s) was clean, dry and intact. Additional findings included an unremarkable neurological exam and peripheral vascular exam normal.

## 2021-07-19 NOTE — HISTORY OF PRESENT ILLNESS
[Stable] : stable [___ mths] : [unfilled] month(s) ago [2] : an average pain level of 2/10 [Walking] : worsened by walking [de-identified] : GURPREET ARELLANO is a 55 year female being seen for p/o removal syndesmotic screw with implantation locking screw R ankle, 5 months ago. She presents FWB. Currently, she reports no pain. She reports d/c PT. She reports doing a lot of walking. She endorses R ankle swelling, especially at the end of the time. At this time she is happy with her progress thus far.

## 2021-07-19 NOTE — DISCUSSION/SUMMARY
[de-identified] : GURPREET ARELLANO is a 55 year female being seen for p/o removal syndesmotic screw with implantation locking screw R ankle, 5 months ago. She presents FWB. Currently, she reports no pain. She reports d/c PT. She reports doing a lot of walking. She endorses R ankle swelling, especially at the end of the time. At this time she is happy with her progress thus far.\par \par At this point, patient ROM has greatly improved, and her pain has subsided, she is doing well and happy with her progress so far. In regards to her swelling, I explained that swelling will improve given time and conservative care. I also discussed chance that chronic swelling might be indicative of plate related reaction, in which case we move forward with hardware removal surgery. However, patient is doing very well and surgery is not indicated at this time. She is advised to wear compression socking in regards to swelling. Patient will follow up in 4-5 months for repeat clinical assessment. All questions were answered and the patient verbalized understanding. The patient is in agreement with this treatment plan.

## 2021-07-19 NOTE — ADDENDUM
[FreeTextEntry1] : Documented by Donald Vang acting as a scribe for Dr. Hsu on 07/19/2021. \par \par All medical record entries made by the Scribe were at my, Dr. Hsu's, direction and\par personally dictated by me on 07/19/2021. I have reviewed the chart and agree that the record\par accurately reflects my personal performance of the history, physical exam, procedure and imaging.

## 2021-08-16 NOTE — ED ADULT NURSE NOTE - NSFALLRSKASSESSTYPE_ED_ALL_ED
pt with peripheral vertigo, improved after treatment, neuro intact, will d/c to f/u with pmd. Patient counseled regarding conditions which should prompt return. Initial (On Arrival)

## 2021-10-22 ENCOUNTER — APPOINTMENT (OUTPATIENT)
Dept: ORTHOPEDIC SURGERY | Facility: CLINIC | Age: 56
End: 2021-10-22
Payer: COMMERCIAL

## 2021-10-22 ENCOUNTER — NON-APPOINTMENT (OUTPATIENT)
Age: 56
End: 2021-10-22

## 2021-10-22 VITALS
BODY MASS INDEX: 31.39 KG/M2 | SYSTOLIC BLOOD PRESSURE: 119 MMHG | WEIGHT: 200 LBS | HEIGHT: 67 IN | DIASTOLIC BLOOD PRESSURE: 81 MMHG | HEART RATE: 83 BPM

## 2021-10-22 DIAGNOSIS — M20.21 HALLUX RIGIDUS, RIGHT FOOT: ICD-10-CM

## 2021-10-22 PROCEDURE — 99214 OFFICE O/P EST MOD 30 MIN: CPT

## 2021-10-22 PROCEDURE — 73610 X-RAY EXAM OF ANKLE: CPT | Mod: RT

## 2021-10-22 PROCEDURE — 73630 X-RAY EXAM OF FOOT: CPT | Mod: RT

## 2021-10-22 NOTE — ADDENDUM
[FreeTextEntry1] : I, Enedelia Reyes, acted solely as a scribe for Dr. Robby Morrissey on this date 10/22/2021.\par \par All medical record entries made by the Scribe were at my, Dr. Robby Morrissey, direction and personally dictated by me on 10/22/2021 . I have reviewed the chart and agree that the record accurately reflects my personal performance of the history, physical exam, assessment and plan. I have also personally directed, reviewed, and agreed with the chart.	\par

## 2021-10-22 NOTE — HISTORY OF PRESENT ILLNESS
[FreeTextEntry1] : 10/22/2021: GURPREET ARELLANO is a 55 year old female presenting for an initial evaluation of right foot pain. The patient’s pain is noted to be a 4 out of 10, localized to her great toe. The patient is concerned with hallux valgus at this time. The patient currently works as an  and notes that she wears flat shoes while working. She cannot attribute their pain to any injury, fall, or trauma. The patient confirms prior ankle hardware surgery with Dr. Hsu with an ORIF of the right distal fibula with syndesmotic screw insertion in 2020. She also confirms hardware removal surgery to the ankle in February 2021. GURPREET denies any numbness or tingling sensations. She is FWB and is wearing flat shoes to the clinic today. No other complaints.

## 2021-10-22 NOTE — PHYSICAL EXAM
[de-identified] : General: Alert and oriented x3. In no acute distress. Pleasant in nature with a normal affect. No apparent respiratory distress.\par \par Right Foot and Ankle Exam\par Skin: Clean, dry, intact\par Inspection: No obvious malalignment, no masses, no swelling, no effusion\par Pulses: 2+ DP/PT pulses\par ROM:  ANKLE Crepitus with 30 degrees of dorsiflexion, 20 degrees of plantarflexion, 10 degrees of subtalar motion.\par Painful ROM: None\par Tenderness: No tenderness over the medial malleolus, No tenderness over the lateral malleolus, no CFL/ATFL/PTFL pain, no deltoid ligament pain. No heel pain. No Achilles tenderness. No 5th metatarsal pain. No pain to the LisFranc joint. No ttp over the posterior tibial tendon.\par Stability: Negative anterior/posterior drawer.\par Strength: 5/5 ADD/ABD/TA/GS/EHL/FHL/EDL\par Neuro: Sensation in tact to light touch throughout\par Additional tests: Negative Mortons test, negative tarsal tunnel tinels, negative single heel rise.			 [de-identified] : XRs of the right foot and ankle were ordered, obtained, and reviewed by me today, 10/22/2021, revealed: Ankle is healed. Hardware is stable and intact. Hallux Rigidus.

## 2021-10-22 NOTE — DISCUSSION/SUMMARY
[de-identified] : Today I had a lengthy discussion with the patient regarding their right foot, Hallux Rigidus. I have addressed all the patient's concerns surrounding the pathology of their condition. XR imaging was completed in office today and results were reviewed with the patient. We discussed both operative and non-operative treatment options, with respect to joint fusion and conservative interventions. At this time,  I recommend that the patient utilize a Cervantes extension plate. The patient was provided with the Cervantes extension plate in the office today. The patient understood and verbally agreed to the treatment plan. All of their questions were answered and they were satisfied with the visit. The patient should call the office if they have any questions or experience worsening symptoms. I would like to see the patient back in the office in 2-3 months to reassess their condition. 				\par

## 2021-11-15 ENCOUNTER — APPOINTMENT (OUTPATIENT)
Dept: ORTHOPEDIC SURGERY | Facility: CLINIC | Age: 56
End: 2021-11-15
Payer: COMMERCIAL

## 2021-11-15 VITALS
WEIGHT: 200 LBS | HEART RATE: 80 BPM | SYSTOLIC BLOOD PRESSURE: 120 MMHG | DIASTOLIC BLOOD PRESSURE: 82 MMHG | BODY MASS INDEX: 31.39 KG/M2 | HEIGHT: 67 IN

## 2021-11-15 DIAGNOSIS — Z98.890 OTHER SPECIFIED POSTPROCEDURAL STATES: ICD-10-CM

## 2021-11-15 PROCEDURE — 99214 OFFICE O/P EST MOD 30 MIN: CPT

## 2021-11-15 NOTE — DISCUSSION/SUMMARY
[de-identified] : GURPREET ARELLANO is a 55 year female being seen for p/o removal syndesmotic screw with implantation locking screw R ankle, 9 months ago. She presents FWB. Currently, she reports mild pain at lateral aspect of her feet mostly after prolonged walking. She believes her pain is activity based, which is improved with rest. She endorses R ankle swelling, especially at the end of the time. At this time she is happy with her progress thus far. She saw Dr. Morrissey, who provided her plantar plate, which she is wearing everyday as instructed. \par \par At this point, patient ROM has greatly improved, and her pain has subsided, she is doing well and happy with her progress so far. On exam, her ROM has progress very well compared to last visit. She does have some shooting pain although its localizes primarily over her ankle and foot. This may be caused by wearing plantar plate. She will see us soon if she continue to have worsening shooting pain. Otherwise, she will see us in 6 months for repeat clinical assessment, and continue care for foot under Dr. Morrissey. All questions were answered and the patient verbalized understanding. The patient is in agreement with this treatment plan. \par

## 2021-11-15 NOTE — PHYSICAL EXAM
[de-identified] : Physical Exam:\par General: Well appearing, no acute distress\par Neurologic: A&Ox3, No focal deficits\par Head: NCAT without abrasions, lacerations, or ecchymosis to head, face, or scalp\par Eyes: No scleral icterus, no gross abnormalities\par Respiratory: Equal chest wall expansion bilaterally, no accessory muscle use\par Lymphatic: No lymphadenopathy palpated\par Skin: Warm and dry\par Psychiatric: Normal mood and affect \par \par Right Ankle Exam\par \par Incision is clean, dry and intact. No surrounding erythema. No drainage. Wound appears to be healed well. Mild effusion noted. She has passive range of motion of dorsiflexion to 15 degrees and of plantar flexion to 45 degrees. Passive inversion to 30 degrees and eversion to 20 degrees. Motor and sensation are intact distally. She has full range of motion of all toes. She has good nerve function. She has no sensory deficits over the saphenous nerve. Dorsalis pedis pulses 2+. Cap refill 2+ to all toes. The surgical incision site(s) was clean, dry and intact. Additional findings included an unremarkable neurological exam and peripheral vascular exam normal.  [de-identified] : 2 views of Right ankle were ordered by Dr. Morrissey and reviewed by me today. They demonstrates Hardware is stable and intact. Hallux Rigidus. No sign of loosening. No other deformity. \par \par

## 2021-11-15 NOTE — ADDENDUM
[FreeTextEntry1] : Documented by Donald Vang acting as a scribe for Dr. Hsu on 11/15/2021. \par \par All medical record entries made by the Scribe were at my, Dr. Hsu's, direction and\par personally dictated by me on 11/15/2021. I have reviewed the chart and agree that the record\par accurately reflects my personal performance of the history, physical exam, procedure and imaging.

## 2021-11-15 NOTE — HISTORY OF PRESENT ILLNESS
[Stable] : stable [___ mths] : [unfilled] month(s) ago [2] : an average pain level of 2/10 [Walking] : worsened by walking [de-identified] : GURPREET ARELLANO is a 55 year female being seen for p/o removal syndesmotic screw with implantation locking screw R ankle, 9 months ago. She presents FWB. Currently, she reports mild pain at lateral aspect of her feet mostly after prolonged walking. She believes her pain is activity based, which is improved with rest. She endorses R ankle swelling, especially at the end of the time. At this time she is happy with her progress thus far. She saw Dr. Morrissey, who provided her plantar plate, which she is wearing everyday as instructed.

## 2021-12-22 ENCOUNTER — TRANSCRIPTION ENCOUNTER (OUTPATIENT)
Age: 56
End: 2021-12-22

## 2022-01-05 ENCOUNTER — TRANSCRIPTION ENCOUNTER (OUTPATIENT)
Age: 57
End: 2022-01-05

## 2022-01-15 ENCOUNTER — TRANSCRIPTION ENCOUNTER (OUTPATIENT)
Age: 57
End: 2022-01-15

## 2022-05-16 ENCOUNTER — APPOINTMENT (OUTPATIENT)
Dept: ORTHOPEDIC SURGERY | Facility: CLINIC | Age: 57
End: 2022-05-16
Payer: COMMERCIAL

## 2022-05-16 VITALS
DIASTOLIC BLOOD PRESSURE: 87 MMHG | WEIGHT: 220 LBS | HEART RATE: 97 BPM | SYSTOLIC BLOOD PRESSURE: 127 MMHG | OXYGEN SATURATION: 99 % | BODY MASS INDEX: 34.53 KG/M2 | HEIGHT: 67 IN

## 2022-05-16 DIAGNOSIS — Z98.890 OTHER SPECIFIED POSTPROCEDURAL STATES: ICD-10-CM

## 2022-05-16 DIAGNOSIS — S82.841A DISPLACED BIMALLEOLAR FRACTURE OF RIGHT LOWER LEG, INITIAL ENCOUNTER FOR CLOSED FRACTURE: ICD-10-CM

## 2022-05-16 DIAGNOSIS — Z87.81 OTHER SPECIFIED POSTPROCEDURAL STATES: ICD-10-CM

## 2022-05-16 PROCEDURE — 99213 OFFICE O/P EST LOW 20 MIN: CPT

## 2022-05-16 NOTE — PHYSICAL EXAM
[de-identified] : Physical Exam:\par General: Well appearing, no acute distress\par Neurologic: A&Ox3, No focal deficits\par Head: NCAT without abrasions, lacerations, or ecchymosis to head, face, or scalp\par Eyes: No scleral icterus, no gross abnormalities\par Respiratory: Equal chest wall expansion bilaterally, no accessory muscle use\par Lymphatic: No lymphadenopathy palpated\par Skin: Warm and dry\par Psychiatric: Normal mood and affect \par \par Right Ankle Exam\par \par Incision is clean, dry and intact. No surrounding erythema. No drainage. Wound appears to be healed well. No effusion noted. She has passive range of motion of dorsiflexion to 15 degrees and of plantar flexion to 45 degrees. Passive inversion to 30 degrees and eversion to 20 degrees. Motor and sensation are intact distally. She has full range of motion of all toes. She has good nerve function. She has no sensory deficits over the saphenous nerve. Dorsalis pedis pulses 2+. Cap refill 2+ to all toes. The surgical incision site(s) was clean, dry and intact. Additional findings included an unremarkable neurological exam and peripheral vascular exam normal.

## 2022-05-16 NOTE — ADDENDUM
[FreeTextEntry1] : Documented by Donald Vang acting as a scribe for Dr. Hsu and Azam Stock PA-C on 05/16/2022. \par \par All medical record entries made by the Scribe were at my, Azam Stock's, direction and\par personally dictated by me on 05/16/2022. I have reviewed the chart and agree that the record\par accurately reflects my personal performance of the history, physical exam, procedure and imaging.

## 2022-05-16 NOTE — DISCUSSION/SUMMARY
[de-identified] : GURPREET ARELLANO is a 55 year female being seen for p/o removal syndesmotic screw with implantation locking screw R ankle, 1 yr 3 mo ago. She presents FWB. Currently, she reports mild pain at anterior aspect of her feet mostly after prolonged walking. She believes her pain is activity based, which is improved with rest. At this time she is happy with her progress thus far. She has been icing her ankle joint, but denies taking NSAIDs or acetaminophen. \par \par At this point, patient ROM has greatly improved, and her pain has subsided, she is doing well and happy with her progress so far. On exam, her ROM has progress very well compared to last visit. On some days, often when patient is going to work in [employer], she has soft tissue edema, and pain post activity for which I advised conservative measures of treatment include rest until asymptomatic, activity avoidance, NSAID's PRN, application to ice to the area 2-3x daily for 20 minutes, with gradual return to activities. She is instructed to wear good footwear, and advised to wear OTC orthotics. Patient will follow up on prn basis for repeat clinical assessment. All questions were answered and the patient verbalized understanding. The patient is in agreement with this treatment plan. \par \par

## 2022-05-16 NOTE — HISTORY OF PRESENT ILLNESS
[Stable] : stable [Walking] : worsened by walking [___ yrs] : [unfilled] year(s) ago [0] : a current pain level of 0/10 [1] : an average pain level of 1/10 [Ice] : relieved by ice [Rest] : relieved by rest [de-identified] : GURPREET ARELLANO is a 55 year female being seen for p/o removal syndesmotic screw with implantation locking screw R ankle, 1 yr 3 mo ago. She presents FWB. Currently, she reports mild pain at anterior aspect of her feet mostly after prolonged walking. She believes her pain is activity based, which is improved with rest. At this time she is happy with her progress thus far. She has been icing her ankle joint, but denies taking NSAIDs or acetaminophen.

## 2023-03-03 ENCOUNTER — OFFICE (OUTPATIENT)
Dept: URBAN - METROPOLITAN AREA CLINIC 12 | Facility: CLINIC | Age: 58
Setting detail: OPHTHALMOLOGY
End: 2023-03-03
Payer: COMMERCIAL

## 2023-03-03 DIAGNOSIS — H43.393: ICD-10-CM

## 2023-03-03 DIAGNOSIS — H16.223: ICD-10-CM

## 2023-03-03 DIAGNOSIS — H25.13: ICD-10-CM

## 2023-03-03 PROCEDURE — 92014 COMPRE OPH EXAM EST PT 1/>: CPT | Performed by: OPHTHALMOLOGY

## 2023-03-03 ASSESSMENT — AXIALLENGTH_DERIVED
OS_AL: 23.1966
OS_AL: 23.1496
OD_AL: 23.0509

## 2023-03-03 ASSESSMENT — REFRACTION_MANIFEST
OS_VA1: 20/20
OD_SPHERE: +0.25
OD_CYLINDER: -0.25
OD_VA1: 20/20
OS_AXIS: 020
OS_SPHERE: -0.25
OD_AXIS: 090
OS_CYLINDER: -0.25

## 2023-03-03 ASSESSMENT — REFRACTION_CURRENTRX
OD_VPRISM_DIRECTION: SV
OS_OVR_VA: 20/
OD_SPHERE: +1.00
OS_VPRISM_DIRECTION: SV
OD_OVR_VA: 20/
OS_SPHERE: +1.00

## 2023-03-03 ASSESSMENT — TONOMETRY
OD_IOP_MMHG: 18
OS_IOP_MMHG: 19

## 2023-03-03 ASSESSMENT — VISUAL ACUITY
OS_BCVA: 20/25
OD_BCVA: 20/25-2

## 2023-03-03 ASSESSMENT — KERATOMETRY
OD_K1POWER_DIOPTERS: 44.75
OS_AXISANGLE_DEGREES: 136
OD_AXISANGLE_DEGREES: 029
OD_K2POWER_DIOPTERS: 45.00
OS_K1POWER_DIOPTERS: 44.50
OS_K2POWER_DIOPTERS: 45.75

## 2023-03-03 ASSESSMENT — REFRACTION_AUTOREFRACTION
OD_AXIS: 000
OS_CYLINDER: -0.50
OD_SPHERE: -0.25
OS_SPHERE: -0.25
OS_AXIS: 059
OD_CYLINDER: SPH

## 2023-03-03 ASSESSMENT — SUPERFICIAL PUNCTATE KERATITIS (SPK)
OD_SPK: T
OS_SPK: T

## 2023-03-03 ASSESSMENT — CONFRONTATIONAL VISUAL FIELD TEST (CVF)
OS_FINDINGS: FULL
OD_FINDINGS: FULL

## 2023-03-03 ASSESSMENT — SPHEQUIV_DERIVED
OS_SPHEQUIV: -0.375
OS_SPHEQUIV: -0.5
OD_SPHEQUIV: 0.125

## 2023-09-18 ENCOUNTER — NON-APPOINTMENT (OUTPATIENT)
Age: 58
End: 2023-09-18

## 2024-03-09 ENCOUNTER — OFFICE (OUTPATIENT)
Dept: URBAN - METROPOLITAN AREA CLINIC 12 | Facility: CLINIC | Age: 59
Setting detail: OPHTHALMOLOGY
End: 2024-03-09
Payer: COMMERCIAL

## 2024-03-09 DIAGNOSIS — H90.3: ICD-10-CM

## 2024-03-09 PROCEDURE — 92557 COMPREHENSIVE HEARING TEST: CPT | Performed by: AUDIOLOGIST-HEARING AID FITTER

## 2024-03-09 PROCEDURE — 92567 TYMPANOMETRY: CPT | Performed by: AUDIOLOGIST-HEARING AID FITTER

## 2024-04-03 ENCOUNTER — OFFICE (OUTPATIENT)
Dept: URBAN - METROPOLITAN AREA CLINIC 12 | Facility: CLINIC | Age: 59
Setting detail: OPHTHALMOLOGY
End: 2024-04-03
Payer: COMMERCIAL

## 2024-04-03 DIAGNOSIS — H16.223: ICD-10-CM

## 2024-04-03 DIAGNOSIS — H25.13: ICD-10-CM

## 2024-04-03 DIAGNOSIS — H43.393: ICD-10-CM

## 2024-04-03 PROCEDURE — 92014 COMPRE OPH EXAM EST PT 1/>: CPT | Performed by: OPHTHALMOLOGY

## 2025-04-05 ENCOUNTER — OFFICE (OUTPATIENT)
Dept: URBAN - METROPOLITAN AREA CLINIC 12 | Facility: CLINIC | Age: 60
Setting detail: OPHTHALMOLOGY
End: 2025-04-05
Payer: COMMERCIAL

## 2025-04-05 DIAGNOSIS — H90.3: ICD-10-CM

## 2025-04-05 PROCEDURE — 92557 COMPREHENSIVE HEARING TEST: CPT | Performed by: AUDIOLOGIST-HEARING AID FITTER

## 2025-04-05 PROCEDURE — 92567 TYMPANOMETRY: CPT | Performed by: AUDIOLOGIST-HEARING AID FITTER
